# Patient Record
(demographics unavailable — no encounter records)

---

## 2024-10-15 NOTE — REVIEW OF SYSTEMS
[TextEntry] : Except as noted above... Constitutional: The patient denied headache, fatigue, fever, sweats, loss of appetite or chills Eyes: The patient denied double vision, eye pain, eye discharge, red eyes or itchy eyes ENT: The patient denied ear pain, ear discharge, nasal congestion, nasal discharge, sore throat, enlarged tonsils, hoarseness, neck pain or neck swelling Cardiovascular: The patient denied chest pain, chest discomfort, dizziness, palpitations, fainting  or leg cramps Respiratory: The patient denied shortness of breath, cough, coughing up blood, wheezing, chest congestion or mucous production GI: The patient denied weight gain, weight loss, abdominal pain, nausea, vomiting, diarrhea, constipation, black stools or bloody stools : The patient denied pain on urination, burning with urination, frequent urination or blood in the urine Skin: The patient denied rashes, redness or swelling Neurologic: The patient denied headache, stiff neck, weakness, numbness, difficulty speaking, unsteadiness or numbness/tingling in feet Psychiatric: The patient denied hallucinations, agitation or disorientation Endocrine: The patient denied excessive thirst, excessive urination, cold intolerance or heat intolerance Hematologic: The patient denied easy bruisability or pallor Allergic/Immunologic: The patient denied runny nose, recurrent infections, hives or pruritis Musculoskeletal: The patient denied  muscle weakness or muscle aches Extremities: The patient denied clubbing, cyanosis or lower extremity swelling

## 2024-10-15 NOTE — HISTORY OF PRESENT ILLNESS
[Preoperative Visit] : for a medical evaluation prior to surgery [Scheduled Procedure ___] : a [unfilled] [Surgeon Name ___] : surgeon: [unfilled] [Stable] : Stable [FreeTextEntry1] : The patient is a 78-year-old woman with a history of hypertension, hypercholesterolemia, paroxysmal atrial fibrillation, scoliosis, and lumbar spinal stenosis.  She also has a history of left leg edema of unclear etiology but probably representing venous insufficiency.  A venous Doppler was negative for clot in the past.  She has been having worsening difficulties with right knee pain from osteoarthritis and is now scheduled for a right total knee replacement.  From a cardiovascular standpoint, the patient is clinically stable.  She denies any chest pain or shortness of breath.  She does have a history of an abnormal electrocardiogram with T wave inversions in V1 and V2 and occasional PVCs.  These are stable and low risk.

## 2024-10-15 NOTE — PHYSICAL EXAM
[TextEntry] : General/Constitutional: WD/ WN in NAD H: NC/AT Eyes:  PERRL, sclerae and conjunctivae normal without jaundice or xanthelasma; ENMT:normal teeth, gums and palate with no petechiae, pallor or cyanosis Neck: w/o JVD, thyromegaly or adenopathy; normal venous contour Respiratory: clear to auscultation, normal respiratory effort with no retractions or use of accessory respiratory muscles Heart: [id number], regular rate, normal S1, S2 without murmurs, rubs, gallops, heaves or thrills Vascular exam: normal carotid upstrokes without carotid or abdominal bruits. 2+/2+ pulses to posterior tibialis and dorsalis pedis Abdomen: soft, nontender, bowels sounds normal without hepatomegaly or splenomegaly, masses or bruits Musculoskeletal:without significant kyphosis or scoliosis Extremities: w/o CC; 2+ left lower extremity edema to the knee and 1+ right lower extremity edema to the upper ankle, good capillary filling Skin: no stasis changes; no ulcers  Neuro: AA and O x 3; no focal neurologic deficits Psych: normal mood and affect

## 2024-10-15 NOTE — DISCUSSION/SUMMARY
[FreeTextEntry1] : Hypertension-well-controlled Scoliosis-the patient continues to wear a back brace which seems to help. Paroxysmal atrial fibrillation-the patient's EKG is normal today.  She describes rare episodes of atrial fibrillation.  Eliquis can be discontinued 4 days prior to the procedure and should be restarted as soon after the procedure as possible Osteoarthritis of the right knee-the patient is medically cleared for the proposed procedure with no special precautions or limitations.  Again, Eliquis can be discontinued 4 days prior to the procedure and should be restarted as soon as possible after the procedure.   Total time of the encounter: 30 minutes which included but was not limited to the following: Face-to-face and non face-to-face time personally spent by the physician preparing to see the patient, obtaining and/or resuming separately obtained history, performing a medically appropriate examination and/or evaluation, counseling and educating the patient/family/caregiver, ordering medications, tests or procedures, referring and communicating with other healthcare professionals, documenting clinical information in the electronic health record, independently interpreting results and communicated results to the patient/family/caregiver and care coordination.

## 2024-11-15 NOTE — HISTORY OF PRESENT ILLNESS
[___ Weeks Post Op] : [unfilled] weeks post op [2] : the patient reports pain that is 2/10 in severity [Clean/Dry/Intact] : clean, dry and intact [Swelling] : swollen [Neuro Intact] : an unremarkable neurological exam [Vascular Intact] : ~T peripheral vascular exam normal [Negative Jame's] : maneuvers demonstrated a negative Jame's sign [Xray (Date:___)] : [unfilled] Xray -  [Doing Well] : is doing well [No Sign of Infection] : is showing no signs of infection [Adequate Pain Control] : has adequate pain control [Chills] : no chills [Constipation] : no constipation [Dysuria] : no dysuria [Fever] : no fever [Nausea] : no nausea [Vomiting] : no vomiting [Erythema] : not erythematous [Discharge] : absent of discharge [Dehiscence] : not dehisced [de-identified] : The patient is a 78-year-old female S/P right total knee replacement performed at [facility] [place] on 10/30/24. Patient presents today for her first post operative visit and Dermabond tape removal. [de-identified] : The patient was discharged from the hospital with home physical therapy and home exercises. She verbalized now feeling well overall. The patient reports surgical pain of 2/10. She is currently utilizing Tramadol as needed for pain relief.  Patient reports feeling better after experiencing what she termed to be "withdrawal symptoms from Dilaudid".  Patient reports she was originally in a lot of pain not relieved by 2 mg of Dilaudid.  She was then advised to increase to 4 mg as needed which she took for 3 to 4 days with good pain control, however, patient reports that when she decided to go back to 2 mg, she started experiencing "some discomfort, increased anxiety and panic attacks".  She was then advised to stop Dilaudid and switched to tramadol.  Patient is using Eliquis for DVT prophylaxis managed by her cardiologist for history of A-fib.  She is ambulating safely with a walker. [de-identified] : The patient is ambulating with moderate antalgic gait utilizing a walker. Patient is S/P right total knee replacement. The knee is with Dermabond tape intact. The surgical incision site is without redness, heat, or drainage. No palpable hematoma or effusion. No calf tenderness. Positive distal pulses. The active ROM of the right knee is from 5-100 degrees. No evidence of ligament laxity, muscle atrophy, motor or sensory deficit. No flexion contracture or extension lag noted. The bilateral lower extremity is with moderate swelling. There is no evidence of infection or cellulitis on the incision site.  [de-identified] :  3 views of the right knee were obtained at today's visit. X-rays reveal a well-positioned, well fixed total knee replacement in good alignment. There is no obvious evidence of fractures, dislocation or osteolysis. [de-identified] : Right total knee replacement [de-identified] : Dermabond was removed from the right knee and replaced with Steri-Strips. Patient tolerated it well. Incisional care was reviewed with the patient. Patient was advised on the nature of the healing process and on the importance of adhering to the DVT prophylaxis now with Eliquis as prescribed.  Patient to continue with physical therapy and home exercises as recommended. Prescription was given for outpatient physical therapy. Patient was encouraged to engage in isometric exercises to assist the knee to come to full extension. She was advised to continue to apply ice to the knee and keep the right lower extremity elevated above the level of the heart to decrease swelling.  She was advised to follow-up with her cardiologist regarding bilateral lower extremities edema.  She was also advised of the need to follow-up with her provider if she experiences a repeat of the episode as described above. Patient was advised of the need for antibiotics for dental prophylaxis per Dr. Balderas's protocol. She may continue to utilize medication as needed for pain relief. Patient to make a follow up appointment to see Dr. Balderas in 6 weeks. She was educated on the signs and symptoms of infection to report. Patient verbalized understanding of all instructions. All her questions were addressed to her satisfaction. Patient was advised to call this office if she has new questions or concerns. My cumulative time spent on this patient's visit was spent on preparation for the visit, review of the medical records, review of pertinent diagnostic studies, examination, and counseling of the patient on the above diagnosis, treatment plan and prognosis, orders of diagnostic tests, medications and/or appropriate procedures and documentation in the medical records of today's visit.  Not including time spent for procedures

## 2025-01-02 NOTE — HISTORY OF PRESENT ILLNESS
[___ Months Post Op] : [unfilled] months post op [Clean/Dry/Intact] : clean, dry and intact [Healed] : healed [Neuro Intact] : an unremarkable neurological exam [Vascular Intact] : ~T peripheral vascular exam normal [Negative Jame's] : maneuvers demonstrated a negative Jame's sign [Doing Well] : is doing well [Excellent Pain Control] : has excellent pain control [No Sign of Infection] : is showing no signs of infection [Chills] : no chills [Constipation] : no constipation [Diarrhea] : no diarrhea [Dysuria] : no dysuria [Fever] : no fever [Nausea] : no nausea [Vomiting] : no vomiting [Erythema] : not erythematous [Discharge] : absent of discharge [Swelling] : not swollen [Dehiscence] : not dehisced [de-identified] : s/p right TKR 10/30/2024 [de-identified] : Patient is a 78-year-old female who presents today for an evaluation regarding her right knee.  She is status post right total knee replacement on October 30, 2024.  Overall she states she has been doing fairly well and still receiving physical therapy.  She states that she does have some discomfort when trying to stand from a seated position with some stiffness.  But overall is very happy.  She states unfortunately she is also complaining of left knee pain.  Which started more recently.  She does understand that she does have osteoarthritic changes.  But feels that due to the therapy that she is receiving for the right knee that this is exacerbated her condition.  She denies any specific injury or accident. [de-identified] : On physical examination of the right knee.  Patient is status post right total knee replacement.  There is a well-healed surgical scar with no evidence of infection superficial or deep.  There is no redness, swelling, heat, discharge or ecchymosis noted.  There is no pain or tenderness on examination.  The patient has good range of motion both passively and actively. As they are able to fully extend the knee with good flexion.  Patient also has good strength with range of motion against resistance.  There is no evidence of ligamentous laxity.  As this was tested in anterior posterior drawer test as well as varus and valgus stress testing.  There is no evidence of muscle atrophy or palpable defect.  No evidence of any motor or sensory deficit.  Patient has good distal pulses with no calf tenderness.  Pt has excellent extension.  As the pt is able to fully extend the knee.  Patient has excellent range of motion.  Able to flex the knee to 130 degrees +  On physical examination of the left knee. The skin is clean, dry and intact with no heat, redness, discharge or any other evidence of infection, superficial or deep. During palpation, it is noted that the pt has some mild joint effusion. There is also some pain and tenderness in all 3 compartments. Mostly in the patellofemoral compartment but also in the lateral femoral tibial compartment. The overall alignment shows a valgus deformity. This is mostly noted when the patient is standing and/or walking. There is no evidence of ligamentous laxity. This was tested in anterior and posterior draw tests, as well as varus and valgus stress tests. There is good muscle tone and strength with no evidence of any atrophy or palpable defect. There is no evidence on any motor or sensory deficit as the pt is neurovascularly intact. Patient has good distal pulses with no calf tenderness. Jame's test is negative. The is some crepitus noted, mostly in the patella femoral compartment during flexion and extension. The patient's range of motion was noted both during active and passive range of motion. This was compared to the opposite side.  [de-identified] : X-rays of the right knee were taken in the office today. This includes all 3 views. The AP, lateral and sunrise views. They each reveal a status post right total knee replacement.  The hardware is placed perfectly and shows excellent alignment as well as fixation. There is no evidence of any fracture, dislocation or loosening of any hardware.  X-rays of the left knee were taken today. This includes 3 views. The knee AP, lateral and sunrise views. They reveal osteoarthritic changes in all 3 views.  There is narrowing of the joint spacing in the patellofemoral compartment but mostly in the lateral femoral-tibial compartment.  There is some osteophyte formation as well as evidence of some sclerosis. There is no evidence of any fracture or dislocation noted.  [de-identified] : Status post right total knee replacement on October 30, 2024 and osteoarthritic changes left knee [de-identified] : After a thorough history, full evaluation and review of x-rays.  It was explained to the patient that there is osteoarthritic changes in the left knee.  The patient was explained the different modalities of treatment which include conservative versus surgical intervention.  The patient was offered an injection here in the office today for the left knee.  Prior to the injection. The patient was explained the risks, benefits, pros and cons, as well as alternatives.  It was explained that there is no guarantee for relief following the injection; and that this is not a cure for the osteoarthritis.  It was also explained to the patient that if there is any relief, this may be short-lived, as the pain may return.  This was explained in great detail in which the patient fully understood and agreed to the injection.  Therefore, it was given under sterile conditions without any complications.    The patient was also advised to continue with conservative management.  This includes a good exercise program consisting of isometric,range of motion and strengthening exercises. It is also advised to continue with ice packs, moist heat and pain medications in the form of anti-inflammatories.  The patient is advised to continue with these conservative measures and to return back to the office in another month or 2 for reevaluation and management.  However, if there is any increase in pain, redness, swelling, heat, discharge or fever.  The patient is advised to notify the office immediately. The patient was assured that they are progressing well post operatively. They were explained that the prosthesis is in perfect alignment, perfect placement and fixated well.  It was explained that they are progressing well and as expected. It is recommended that the pt continue with the current treatment plan. This includes an ongoing exercise program, ice/moist heat when needed and NSAID's when needed. It was explained that a good exercise routine will help with pain relief and improve the overall longevity of the prosthesis. The patient is advised to return to the office in another year or so for further evaluation and x-rays. However, if there is any increase in pain, redness, swelling, heat, discharge, fever, change in ambulation or pain. The patient is strongly advised to call the office sooner.

## 2025-01-02 NOTE — HISTORY OF PRESENT ILLNESS
Case management- This clinician went to provide patient with list of homeless shelters. At this time, patient informed this clinician that his grandparents are going to take him in permanently and he does not need a list of shelters.    [___ Months Post Op] : [unfilled] months post op [Clean/Dry/Intact] : clean, dry and intact [Healed] : healed [Neuro Intact] : an unremarkable neurological exam [Vascular Intact] : ~T peripheral vascular exam normal [Negative Jame's] : maneuvers demonstrated a negative Jame's sign [Doing Well] : is doing well [Excellent Pain Control] : has excellent pain control [No Sign of Infection] : is showing no signs of infection [Chills] : no chills [Constipation] : no constipation [Diarrhea] : no diarrhea [Dysuria] : no dysuria [Fever] : no fever [Nausea] : no nausea [Vomiting] : no vomiting [Erythema] : not erythematous [Discharge] : absent of discharge [Swelling] : not swollen [Dehiscence] : not dehisced [de-identified] : s/p right TKR 10/30/2024 [de-identified] : Patient is a 78-year-old female who presents today for an evaluation regarding her right knee.  She is status post right total knee replacement on October 30, 2024.  Overall she states she has been doing fairly well and still receiving physical therapy.  She states that she does have some discomfort when trying to stand from a seated position with some stiffness.  But overall is very happy.  She states unfortunately she is also complaining of left knee pain.  Which started more recently.  She does understand that she does have osteoarthritic changes.  But feels that due to the therapy that she is receiving for the right knee that this is exacerbated her condition.  She denies any specific injury or accident. [de-identified] : On physical examination of the right knee.  Patient is status post right total knee replacement.  There is a well-healed surgical scar with no evidence of infection superficial or deep.  There is no redness, swelling, heat, discharge or ecchymosis noted.  There is no pain or tenderness on examination.  The patient has good range of motion both passively and actively. As they are able to fully extend the knee with good flexion.  Patient also has good strength with range of motion against resistance.  There is no evidence of ligamentous laxity.  As this was tested in anterior posterior drawer test as well as varus and valgus stress testing.  There is no evidence of muscle atrophy or palpable defect.  No evidence of any motor or sensory deficit.  Patient has good distal pulses with no calf tenderness.  Pt has excellent extension.  As the pt is able to fully extend the knee.  Patient has excellent range of motion.  Able to flex the knee to 130 degrees +  On physical examination of the left knee. The skin is clean, dry and intact with no heat, redness, discharge or any other evidence of infection, superficial or deep. During palpation, it is noted that the pt has some mild joint effusion. There is also some pain and tenderness in all 3 compartments. Mostly in the patellofemoral compartment but also in the lateral femoral tibial compartment. The overall alignment shows a valgus deformity. This is mostly noted when the patient is standing and/or walking. There is no evidence of ligamentous laxity. This was tested in anterior and posterior draw tests, as well as varus and valgus stress tests. There is good muscle tone and strength with no evidence of any atrophy or palpable defect. There is no evidence on any motor or sensory deficit as the pt is neurovascularly intact. Patient has good distal pulses with no calf tenderness. Jame's test is negative. The is some crepitus noted, mostly in the patella femoral compartment during flexion and extension. The patient's range of motion was noted both during active and passive range of motion. This was compared to the opposite side.  [de-identified] : X-rays of the right knee were taken in the office today. This includes all 3 views. The AP, lateral and sunrise views. They each reveal a status post right total knee replacement.  The hardware is placed perfectly and shows excellent alignment as well as fixation. There is no evidence of any fracture, dislocation or loosening of any hardware.  X-rays of the left knee were taken today. This includes 3 views. The knee AP, lateral and sunrise views. They reveal osteoarthritic changes in all 3 views.  There is narrowing of the joint spacing in the patellofemoral compartment but mostly in the lateral femoral-tibial compartment.  There is some osteophyte formation as well as evidence of some sclerosis. There is no evidence of any fracture or dislocation noted.  [de-identified] : Status post right total knee replacement on October 30, 2024 and osteoarthritic changes left knee [de-identified] : After a thorough history, full evaluation and review of x-rays.  It was explained to the patient that there is osteoarthritic changes in the left knee.  The patient was explained the different modalities of treatment which include conservative versus surgical intervention.  The patient was offered an injection here in the office today for the left knee.  Prior to the injection. The patient was explained the risks, benefits, pros and cons, as well as alternatives.  It was explained that there is no guarantee for relief following the injection; and that this is not a cure for the osteoarthritis.  It was also explained to the patient that if there is any relief, this may be short-lived, as the pain may return.  This was explained in great detail in which the patient fully understood and agreed to the injection.  Therefore, it was given under sterile conditions without any complications.    The patient was also advised to continue with conservative management.  This includes a good exercise program consisting of isometric,range of motion and strengthening exercises. It is also advised to continue with ice packs, moist heat and pain medications in the form of anti-inflammatories.  The patient is advised to continue with these conservative measures and to return back to the office in another month or 2 for reevaluation and management.  However, if there is any increase in pain, redness, swelling, heat, discharge or fever.  The patient is advised to notify the office immediately. The patient was assured that they are progressing well post operatively. They were explained that the prosthesis is in perfect alignment, perfect placement and fixated well.  It was explained that they are progressing well and as expected. It is recommended that the pt continue with the current treatment plan. This includes an ongoing exercise program, ice/moist heat when needed and NSAID's when needed. It was explained that a good exercise routine will help with pain relief and improve the overall longevity of the prosthesis. The patient is advised to return to the office in another year or so for further evaluation and x-rays. However, if there is any increase in pain, redness, swelling, heat, discharge, fever, change in ambulation or pain. The patient is strongly advised to call the office sooner.

## 2025-01-28 NOTE — HISTORY OF PRESENT ILLNESS
[___ Months Post Op] : [unfilled] months post op [2] : the patient reports pain that is 2/10 in severity [Chills] : no chills [Constipation] : no constipation [Diarrhea] : no diarrhea [Dysuria] : no dysuria [Fever] : no fever [Nausea] : no nausea [Vomiting] : no vomiting [Clean/Dry/Intact] : clean, dry and intact [Healed] : healed [Erythema] : not erythematous [Discharge] : absent of discharge [Dehiscence] : not dehisced [Neuro Intact] : an unremarkable neurological exam [Vascular Intact] : ~T peripheral vascular exam normal [Negative Jame's] : maneuvers demonstrated a negative Jame's sign [Xray (Date:___)] : [unfilled] Xray -  [Hardware in Good Position] : hardware in good position [No Obvious Fractures] : no obvious fractures [Good Overall Alignment] : good overall alignment [de-identified] : Right TKR 10/30/24 [de-identified] : The patient is a 78-year-old female who presents today for follow-up of a right total knee replacement on October 30, 2024.  Patient states she is doing better today than she has been over the past several months.  She did hold off on physical therapy for a little bit and riding the bike and states that the discomfort that she was having better.  Presently she is ambulating independently. [de-identified] : Well-healed scar to the right knee without erythema, drainage, or evidence of cellulitis.  Less than 5 degree laxity with varus valgus stresses.  Negative anterior posterior drawer.  No extension lag.  No flexion contractures.  Range of motion 0-1 35.  Calves are soft, nontender, no evidence of DVT at this time.  Patient is good motor and sensory to both leg. [de-identified] : Right total knee replacement, in anatomical alignment, excellent bone to prosthesis interface, there is no gross evidence of prosthetic failure, all 3 components are perfectly anatomically aligned.  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  She will follow-up 6 months from the date of surgery all her questions were thoroughly answered to her satisfaction.

## 2025-02-10 NOTE — HISTORY OF PRESENT ILLNESS
[Patient reported mammogram was normal] : Patient reported mammogram was normal [Patient reported breast sonogram was normal] : Patient reported breast sonogram was normal [Patient reported colonoscopy was normal] : Patient reported colonoscopy was normal [FreeTextEntry1] : 02/10/2025. RYLEY JARA 78-year-old female [id number] LMP pm. She presents for an annual gyn exam.  Patient c/o protrusion in LLQ which she noticed two weeks ago. Not associated w/ coughing or bearing down. Not noticeable when lying down. Denies associated pain. Admits constipation, for which she takes MiraLAX and eats raisin bran.   She denies abdominal and pelvic pain. No abnormal vaginal bleeding, vaginal discharge, or vaginitis symptoms. She reports normal urination, no dysuria, no incontinence of urine.   Pt w/ h/o scoliosis, well managed w/ brace. Under care of chiropractor.   No new medical conditions, medications, or surgeries since last visit.  PHQ: 1.  MedHx: HLD, depression, AFib, hypothyroid, scoliosis  SHx: hysterectomy 2023, R knee replacement 10/24 FHx: denies All: PCN - hives, Sulfa - hives Meds: Atorvastatin, Zoloft, Famotidine, Eliquis, Reclast [TextBox_4] : Pelvic sono 4/23 [Mammogramdate] : 02/24 [BreastSonogramDate] : 02/24 [PapSmeardate] : 2022 [BoneDensityDate] : 07/24 [TextBox_37] : osteopenia of hip, on Reclast [ColonoscopyDate] : 2022

## 2025-02-10 NOTE — PHYSICAL EXAM
[Chaperone Present] : A chaperone was present in the examining room during all aspects of the physical examination [34011] : A chaperone was present during the pelvic exam. [Appropriately responsive] : appropriately responsive [Alert] : alert [No Acute Distress] : no acute distress [No Lymphadenopathy] : no lymphadenopathy [Regular Rate Rhythm] : regular rate rhythm [No Murmurs] : no murmurs [Clear to Auscultation B/L] : clear to auscultation bilaterally [Soft] : soft [Non-tender] : non-tender [Non-distended] : non-distended [No HSM] : No HSM [No Lesions] : no lesions [No Mass] : no mass [Oriented x3] : oriented x3 [Examination Of The Breasts] : a normal appearance [Breast Nipple Inversion Left] : inverted [No Masses] : no breast masses were palpable [Labia Majora] : normal [Labia Minora] : normal [Normal] : normal [Absent] : absent [Uterine Adnexae] : normal [FreeTextEntry2] : Shane Pearza [FreeTextEntry6] : L nipple inversion [FreeTextEntry9] : Guaiac negative, no masses

## 2025-02-10 NOTE — SIGNATURES
[TextEntry] : This note was authored by Shane Peraza working as a scribe for Dr. Farideh Stock.   I, Dr. Farideh Stock, have reviewed the content of this note and confirm it is true and accurate. I personally performed the history and physical examination and made all the decisions. 02/10/2025

## 2025-02-10 NOTE — PLAN
[FreeTextEntry1] : 78 year y/o female patient presents for routine gyn exam. BSE taught. Breast and pelvic exam performed. Pap/HPV conducted. Rx given for mammogram and breast sonogram. Pt UTD w/ colonoscopy.  Osteopenia: Scheduled to f/u w/ endo Dr. James in 7/25 Continue w/ Reclast Advised weight-bearing exercises, Vit D, calcium in diet  Abdominal Protrusion: Advised to schedule pelvic sonogram--poss CT scan with PMD to rule out hernia.  RTO in 1 year, or PRN.

## 2025-02-18 NOTE — REASON FOR VISIT
[FreeTextEntry1] : The patient is here today for follow-up of paroxysmal atrial fibrillation, hypercholesterolemia and osteoarthritis.  The patient is status post right total knee replacement and is doing well with that.  She continues to be limited primarily by arthritis although she has been having some difficulties with left knee arthritis.  She recently received an injection in the knee with some improvement.  The patient denies any palpitations, chest pain or shortness of breath.  Her EKG today does demonstrate a wandering atrial pacemaker.  Otherwise, the patient appears to be doing well.

## 2025-02-18 NOTE — DISCUSSION/SUMMARY
[EKG obtained to assist in diagnosis and management of assessed problem(s)] : EKG obtained to assist in diagnosis and management of assessed problem(s) [TextEntry] :  hypertension-well-controlled Hypercholesterolemia-well-controlled Paroxysmal atrial fibrillation-no symptomatic episodes of atrial fibrillation although the patient does have wandering atrial pacemaker. Osteoarthritis-as per Dr. Balderas Return visit 6 months.   Total time of the encounter: 30  minutes which included but was not limited to the following: Face-to-face and non face-to-face time personally spent by the physician preparing to see the patient, obtaining and/or resuming separately obtained history, performing a medically appropriate examination and/or evaluation, counseling and educating the patient/family/caregiver, ordering medications, tests or procedures, referring and communicating with other healthcare professionals, documenting clinical information in the electronic health record, independently interpreting results and communicated results to the patient/family/caregiver and care coordination.

## 2025-02-18 NOTE — PHYSICAL EXAM
[TextEntry] :  general/Constitutional: WD/ WN in NAD H: NC/AT Eyes:  PERRL, sclerae and conjunctivae normal without jaundice or xanthelasma; ENMT:normal teeth, gums and palate with no petechiae, pallor or cyanosis Neck: w/o JVD, thyromegaly or adenopathy; normal venous contour Respiratory: clear to auscultation, normal respiratory effort with no retractions or use of accessory respiratory muscles Heart: [id number], regular rate, normal S1, S2 without murmurs, rubs, gallops, heaves or thrills Vascular exam: normal carotid upstrokes without carotid or abdominal bruits. 2+/2+ pulses to posterior tibialis and dorsalis pedis Abdomen: soft, nontender, bowels sounds normal without hepatomegaly or splenomegaly, masses or bruits Musculoskeletal:without significant kyphosis or scoliosis Extremities: w/o CC; 1-2+ left ankle edema, good capillary filling Skin: no stasis changes; no ulcers  Neuro: AA and O x 3; no focal neurologic deficits Psych: normal mood and affect

## 2025-04-09 NOTE — REASON FOR VISIT
[FreeTextEntry1] : The patient is here today for follow-up of exertional shortness of breath, osteoarthritis and scoliosis.  The patient has a long history of severe scoliosis.  She has been working with a chiropractor in January but because of the weather and other issues she has not seen them recently.  She plans to go back.  The patient has chronic difficulties with exertional shortness of breath.  She has had echocardiograms in the past which demonstrate normal left ventricular function but some degree of diastolic dysfunction and mild pulmonary hypertension.  She also has a history of osteoarthritis.  She is status post right total knee replacement and has been having some pain in the left knee as well although it is unclear how much the arthritis in the knee is limiting her mobility.  The patient does work at 2 days a week with a  but I urged her to try to get into habit of walking at least 5 days a week to maintain stamina.  I explained that while working annually to this week would not build stamina.  She also has mild leg swelling which is secondary to venous insufficiency.

## 2025-04-09 NOTE — PHYSICAL EXAM
[TextEntry] :  general/Constitutional: WD/ WN in NAD H: NC/AT Eyes:  PERRL, sclerae and conjunctivae normal without jaundice or xanthelasma; ENMT:normal teeth, gums and palate with no petechiae, pallor or cyanosis Neck: w/o JVD, thyromegaly or adenopathy; normal venous contour Respiratory: clear to auscultation, normal respiratory effort with no retractions or use of accessory respiratory muscles Heart: [id number], regular rate, normal S1, S2 without murmurs, rubs, gallops, heaves or thrills Vascular exam: normal carotid upstrokes without carotid or abdominal bruits. 2+/2+ pulses to posterior tibialis and dorsalis pedis Abdomen: soft, nontender, bowels sounds normal without hepatomegaly or splenomegaly, masses or bruits Musculoskeletal:without significant kyphosis or scoliosis Extremities: w/o CC; 1-2+ left ankle edema, good capillary filling Skin: no stasis changes; no ulcers  Neuro: AA and O x 3; no focal neurologic deficits Psych: normal mood and affect

## 2025-04-09 NOTE — DISCUSSION/SUMMARY
[TextEntry] :  hypertension-well-controlled Hypercholesterolemia-well-controlled Paroxysmal atrial fibrillation-patient is in regular rhythm today. Osteoarthritis-as above Shortness of breath-PFTs will be ordered to assess for possible restrictive lung disease in view of the patient's scoliosis.  Patient will also begin regular walking program. Return visit 4 months    Total time of the encounter: 30 minutes which included but was not limited to the following: Face-to-face and non face-to-face time personally spent by the physician preparing to see the patient, obtaining and/or resuming separately obtained history, performing a medically appropriate examination and/or evaluation, counseling and educating the patient/family/caregiver, ordering medications, tests or procedures, referring and communicating with other healthcare professionals, documenting clinical information in the electronic health record, independently interpreting results and communicated results to the patient/family/caregiver and care coordination.

## 2025-05-14 NOTE — PHYSICAL EXAM
[FreeTextEntry7] : no suprapubic tenderness [Vulvar Atrophy] : vulvar atrophy [Labia Majora] : normal [Labia Minora] : normal [Atrophy] : atrophy [Normal] : normal [Uterine Adnexae] : normal

## 2025-05-14 NOTE — HISTORY OF PRESENT ILLNESS
[FreeTextEntry1] : 78 yo  female presents with 2 days of increased urinary frequency. reports urinating q1-2 hours. denies dysuria, hematuria or abn pain. Pt reports "feeling cold" yesterday. denies rigors or sweating  Denies new medications  currently on diltizem, famotidine, zoloft, eloquis, synthroid, atorvastatin and multaq

## 2025-05-14 NOTE — PLAN
[FreeTextEntry1] : A/P: 78 yo female with c/o increased urinary frequency and 1 episode of "feeling cold" - f/u urine culture  f/u prn and for annual

## 2025-05-28 NOTE — ASSESSMENT
[FreeTextEntry1] : Shortness of breath with no apparent explanation by chest x-ray or PFT.  Suspect combination of deconditioning and back issues.  Cannot exclude asthma as has nonspecific bronchodilator response and will give 2-week Trelegy trial provided cardiology ok's it.

## 2025-05-28 NOTE — ADDENDUM
[FreeTextEntry1] : I, Vannesa Denson, acted solely as a scribe for Dr. Wellington James M.D. on this date 05/28/2025  All medical record entries made by the Scribe were at my, Dr. Wellington James M.D., direction and personally dictated by me on 05/28/2025 I have reviewed the chart and agree that the record accurately reflects my personal performance of the history, physical exam, assessment and plan. I have also personally directed, reviewed, and agreed with the chart.

## 2025-05-28 NOTE — HISTORY OF PRESENT ILLNESS
[Never] : never [TextBox_4] : 79 year old F with a history of scoliosis presents for initial office visit for pulmonary evaluation Pt reports SOB on exertion, she notes SOB when walking which started after she had Covid-19 Pt denies chronic cough and fever  Pt uses a back brace, she only removes it to shower, she notes it hurts to walk without it.  Pt reports having lost height due to scoliosis. She was previously 5'7 and is now 5'3

## 2025-05-28 NOTE — REVIEW OF SYSTEMS
[SOB on Exertion] : sob on exertion [Back Pain] : back pain [Negative] : Endocrine [TextBox_94] : Scoliosis

## 2025-05-28 NOTE — PROCEDURE
[FreeTextEntry1] : PFT demonstrates normal spirometry lung volumes.  There is a mild diffusion impairment there is a nonspecific bronchodilator response  chest x-ray demonstrates scoliosis without significant pulmonary abnormalities

## 2025-07-03 NOTE — PHYSICAL EXAM
[Normal Appearance] : normal appearance [Well Groomed] : well groomed [Skin Color & Pigmentation] : normal skin color and pigmentation [Affect] : the affect was normal [Mood] : the mood was normal [Not Anxious] : not anxious [Chaperoned Physical Exam] : A chaperone was present in the examining room during all aspects of the physical examination. [de-identified] : Stage III Cystocele on exam [place] well supported no rectocele  POPQ  +2   +2       -8 4     2       9 -3    -3       [FreeTextEntry2] : Pelvic exam chaperoned by Elaine Kebede, Medical Assistant

## 2025-07-03 NOTE — HISTORY OF PRESENT ILLNESS
[FreeTextEntry1] : 79 yr old female patient with hx of POP  s/p 10/2023 supracervical hysterectomy BS &  sacrocolpopexy with hysterectomy s/p 10/30/24 R knee replacement  Rarely UUI  (2x  / week)    Denies of HALIE  wears liner for protective purpose, usually dry Does need to lean forward to void  Joseluis - no infections in last 1+ year drinking 5 glasses  water day  d/c ellura  Takes Miralax daily - BM daily with soft stool  Eating more salad    pvr = 0 cc

## 2025-07-03 NOTE — ASSESSMENT
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 Prescribed Vaginal Estradiol cream to be used 3x / week - Mon / Wed / Friday Instructed patient on appropriate use - with an entire applicator full into the vagina and then to distribute the cream throughout the vagina with a focus on the roof of the vagina near the urethral meatus Counseled patient on the findings of recent studies and guidelines suggesting the safety of vaginal estrogen cream  defer oab meds  counseled pt that tramadol may contribute to constipation will refer to acupuncture for back pain (she is not interested in surgery)  she now has a stage III Cystocele her apex is well suspended counseled pt that her original surgery is doing its job but the unprotected area in the front that supports her bladder is coming down  she is not significantly symptomatic, some voiding symptoms and UUI, but not bothered as much by the bulge  we discussed observation vs pessary vs vaginal native tissue repair    will see my URLEW chand

## 2025-07-30 NOTE — PHYSICAL EXAM
[Alert] : alert [Well Nourished] : well nourished [No Acute Distress] : no acute distress [Well Developed] : well developed [Normal Sclera/Conjunctiva] : normal sclera/conjunctiva [EOMI] : extra ocular movement intact [No Proptosis] : no proptosis [Thyroid Not Enlarged] : the thyroid was not enlarged [No Thyroid Nodules] : no palpable thyroid nodules [Clear to Auscultation] : lungs were clear to auscultation bilaterally [Normal S1, S2] : normal S1 and S2 [Normal Rate] : heart rate was normal [Regular Rhythm] : with a regular rhythm [No Edema] : no peripheral edema [Normal Bowel Sounds] : normal bowel sounds [Not Tender] : non-tender [Not Distended] : not distended [Soft] : abdomen soft [Normal Anterior Cervical Nodes] : no anterior cervical lymphadenopathy [No Spinal Tenderness] : no spinal tenderness [Kyphosis] : kyphosis present [Scoliosis] : scoliosis present [No Stigmata of Cushings Syndrome] : no stigmata of Cushings Syndrome [Normal Reflexes] : deep tendon reflexes were 2+ and symmetric [No Tremors] : no tremors [Oriented x3] : oriented to person, place, and time [de-identified] : severe kyphoscoliosis, ribs at pelvis. Pt wears a back brace for scoliosis.  [de-identified] : right lower extremity leg shorter than left.  Uses a walker

## 2025-07-30 NOTE — HISTORY OF PRESENT ILLNESS
[FreeTextEntry1] :  Pt returns for a follow-up visit for osteoporosis. Had knee replacement Oct 2024 in [facility], currently uses back brace for scoliosis.  No hospitalizations, fractures or changes in medication. Up to date with dentist. No major dental work planned.  Was taking 2000IU daily but was told of high vit d, was recommended to stop supplement for 1 month and restart vit d 1000IU daily.  Pt has been told of moderately low bone density ~2014. Pt was offered Fosamax by her gyn at this time. She did not take any osteoporosis rx because she has GERD. Pt has h/o many fx to include proximal femur fx from fall in kitchen with surgery Oct 2017. Her R leg is sl shorter than her L. Prior R elbow and L wrist fx. She had 1 dose of Prolia Jan 2018 by Dr. Frances Barrett. Pt had terrible pain near her prior fx, she had difficulty walking, vaginal discomfort and jaw pain. Since stopping Prolia she thinks she feels much better though she still has R hip pain.  Pt gets moderate amounts of dietary ca but sometimes this bothers her IBS.  Pt had first dose of IV Reclast in Oct 2018. Had APR for 2 days. BMD 9/2018 indicates osteopenia in the proximal radius. BMD 11/2019 indicates low but stable osteopenia at all sites. Second dose of IV Reclast 12/2021, tolerated well.   Pt has hypothyroidism. She has been on various doses of Synthroid, currently 137 mcg daily. No local neck pain. No dysphagia or dysphonia. No raciness, shakiness, heat/cold intolerance, tiredness, or fatigue. No palpitations, tremors, or sudden weight gain/loss.  History of significant scoliosis.  Patient previously was told by various surgeons to have corrective surgery. Patient recently saw a chiropractor who treated her with exercises a local back brace with some relief of symptoms.  Patient is no longer planning surgery at this time.

## 2025-07-30 NOTE — PHYSICAL EXAM
[Alert] : alert [Well Nourished] : well nourished [No Acute Distress] : no acute distress [Well Developed] : well developed [Normal Sclera/Conjunctiva] : normal sclera/conjunctiva [EOMI] : extra ocular movement intact [No Proptosis] : no proptosis [Thyroid Not Enlarged] : the thyroid was not enlarged [No Thyroid Nodules] : no palpable thyroid nodules [Clear to Auscultation] : lungs were clear to auscultation bilaterally [Normal S1, S2] : normal S1 and S2 [Normal Rate] : heart rate was normal [Regular Rhythm] : with a regular rhythm [No Edema] : no peripheral edema [Normal Bowel Sounds] : normal bowel sounds [Not Tender] : non-tender [Not Distended] : not distended [Soft] : abdomen soft [Normal Anterior Cervical Nodes] : no anterior cervical lymphadenopathy [No Spinal Tenderness] : no spinal tenderness [Kyphosis] : kyphosis present [Scoliosis] : scoliosis present [No Stigmata of Cushings Syndrome] : no stigmata of Cushings Syndrome [Normal Reflexes] : deep tendon reflexes were 2+ and symmetric [No Tremors] : no tremors [Oriented x3] : oriented to person, place, and time [de-identified] : severe kyphoscoliosis, ribs at pelvis. Pt wears a back brace for scoliosis.  [de-identified] : right lower extremity leg shorter than left.  Uses a walker

## 2025-07-30 NOTE — PROCEDURE
[FreeTextEntry1] : Bone Mineral Density: 07/29/2025 Indication: vs 2024 to assess response to medication Spine not performed Total hip: -2.6 osteoporosis, -6.1% Femoral neck: -2.0 osteopenia, -5.0% Proximal radius: -2.4 osteopenia, no significant change  Bone Mineral Density: 07/23/2024 Indication: Comparison to 2023, assess response to medication Spine: not performed  Total hip: -2.3, osteopenia, no significant change Femoral neck: -1.7, osteopenia, no significant change Proximal radius: -2.4, osteopenia, -4.0%  Bone Density 1/10/2023 Spine; not performed  Total Hip -2.1 osteopenia +4.7%  Femoral Neck -1.5 osteopenia +13.1%  Proximal Radius -2.0 osteopenia , no significant change   Bone mineral density: 11/22/2021  Indication: vs. 2019 Spine: not performed Total hip: -2.3 osteopenia, -5.0% Femoral neck: -2.2 osteopenia, -9.5% Proximal radius: -2.0 osteopenia, no significant change  Bone mineral density: 11/20/2019  Indication: hip compared to outside study 2017 Spine: not performed Total hip: -2.1 osteopenia, no significant change Femoral neck: -1.7 osteopenia, no significant change Proximal radius: -1.9 osteopenia, no significant change vs. 2018  Bone mineral density: 09/18/2018  Proximal radius: -2.0 osteopenia

## 2025-07-30 NOTE — ASSESSMENT
[Bisphosphonate Therapy] : Risks and benefits of bisphosphonate therapy were  discussed with the patient including gastroesophageal irritation, osteonecrosis of the jaw, and atypical femur fractures, and acute phase reaction [Levothyroxine] : The patient was instructed to take Levothyroxine on an empty stomach, separate from vitamins, and wait at least 30 minutes before eating [FreeTextEntry3] : IV Reclast [FreeTextEntry1] : 79-year-old female with:  1. Osteoporosis: Pt has known of low bone density since ~2014. Pt has active GERD and is not a candidate for standard oral bisphosphates. Pt had 1 dose of Prolia in Jan 2018. She did not tolerate this due to a multitude of reported pain and complication which I discussed is likely unrelated to Prolia.  BMD 9/2018 indicated osteopenia in the proximal radius.  Pt had first dose of IV Reclast in Oct 2018. Had APR for 2 days. BMD 11/2019 indicates low but stable osteopenia at all sites. Pt was recommended to get a second dose of IV Reclast, but pt did not go for second infusion due to unclear reasons. BMD 11/2021 indicates worsened osteopenia in hip, significantly worsened in femoral neck, and stable osteopenia in proximal radius. Second dose of IV Reclast 12/2021, tolerated well. BMD 1/2023 shows that there has been a nice improvement in bone density in the total hip and femoral neck. BMD 07/2024 indicates stable osteopenia in total hip and fem neck, decreased osteopenia in prox. radius.  Bone density 07/2025 shows decreased osteoporosis in the total hip, decreased osteopenia in the femoral neck, and stable osteopenia in the prox radius. BMD reviewed with pt., and I recommend third dose of Reclast.   All questions were answered. Rx information handout provided. The patient understands and elects to get another dose of IV Reclast. Referred to rheumatology infusion center. Consent signed.  Severe kyphoscoliosis: pt wears a back brace, ribs at pelvis, stable on physical examination.  2. Hypothyroidism: Clinically and chemically euthyroid on Synthroid 137 mcg daily. No local neck pain. No dysphagia or dysphonia. No raciness, shakiness, heat/cold intolerance, tiredness, or fatigue. No palpitations, tremors, or sudden weight gain/loss. Recent labs indicate elevated TSH.  3. Hemoglobin A1c 5.9% (05/2025) shows stable prediabetes. Natural history of prediabetes discussed in detail. Pt advised re: watching weight, maintaining moderate to low carbohydrate intake. Controversy concerning use of metformin for prediabetes reviewed.  Request labs from Dr Tk Medel F/u in 1 year w/ BMD

## 2025-07-30 NOTE — END OF VISIT
[FreeTextEntry3] : This note was written by Lauren Andersen on (07/29/2025) acting as a medical scribe for Dr. James. This note was authored by the medical scribe for me. I have reviewed, edited, and revised the note as needed. I am in agreement with the exam findings, imaging findings, and treatment plan. Alessandro James MD

## 2025-07-30 NOTE — HISTORY OF PRESENT ILLNESS
[FreeTextEntry1] :  Pt returns for a follow-up visit for osteoporosis. Had knee replacement Oct 2024 in [facility], currently uses back brace for scoliosis.  No hospitalizations, fractures or changes in medication. Up to date with dentist. No major dental work planned.  Was taking 2000IU daily but was told of high vit d, was recommended to stop supplement for 1 month and restart vit d 1000IU daily.  Pt has been told of moderately low bone density ~2014. Pt was offered Fosamax by her gyn at this time. She did not take any osteoporosis rx because she has GERD. Pt has h/o many fx to include proximal femur fx from fall in kitchen with surgery Oct 2017. Her R leg is sl shorter than her L. Prior R elbow and L wrist fx. She had 1 dose of Prolia Jan 2018 by Dr. Frances Barrett. Pt had terrible pain near her prior fx, she had difficulty walking, vaginal discomfort and jaw pain. Since stopping Prolia she thinks she feels much better though she still has R hip pain.  Pt gets moderate amounts of dietary ca but sometimes this bothers her IBS.  Pt had first dose of IV Reclast in Oct 2018. Had APR for 2 days. BMD 9/2018 indicates osteopenia in the proximal radius. BMD 11/2019 indicates low but stable osteopenia at all sites. Second dose of IV Reclast 12/2021, tolerated well.   Pt has hypothyroidism. She has been on various doses of Synthroid, currently 137 mcg daily. No local neck pain. No dysphagia or dysphonia. No raciness, shakiness, heat/cold intolerance, tiredness, or fatigue. No palpitations, tremors, or sudden weight gain/loss.  History of significant scoliosis.  Patient previously was told by various surgeons to have corrective surgery. Patient recently saw a chiropractor who treated her with exercises a local back brace with some relief of symptoms.  Patient is no longer planning surgery at this time.